# Patient Record
Sex: MALE | Race: WHITE | Employment: OTHER | URBAN - METROPOLITAN AREA
[De-identification: names, ages, dates, MRNs, and addresses within clinical notes are randomized per-mention and may not be internally consistent; named-entity substitution may affect disease eponyms.]

---

## 2017-01-01 ENCOUNTER — HOSPITAL ENCOUNTER (EMERGENCY)
Facility: CLINIC | Age: 67
Discharge: HOME | End: 2017-02-13
Attending: EMERGENCY MEDICINE | Admitting: EMERGENCY MEDICINE
Payer: MEDICARE

## 2017-01-01 ENCOUNTER — DOCUMENTATION ONLY (OUTPATIENT)
Dept: OTHER | Facility: CLINIC | Age: 67
End: 2017-01-01

## 2017-01-01 VITALS
OXYGEN SATURATION: 90 % | HEART RATE: 81 BPM | BODY MASS INDEX: 26.18 KG/M2 | DIASTOLIC BLOOD PRESSURE: 84 MMHG | SYSTOLIC BLOOD PRESSURE: 117 MMHG | RESPIRATION RATE: 24 BRPM | WEIGHT: 198.41 LBS

## 2017-01-01 DIAGNOSIS — G30.0 EARLY ONSET ALZHEIMER'S DISEASE WITH BEHAVIORAL DISTURBANCE (H): ICD-10-CM

## 2017-01-01 DIAGNOSIS — F02.818 EARLY ONSET ALZHEIMER'S DISEASE WITH BEHAVIORAL DISTURBANCE (H): ICD-10-CM

## 2017-01-01 DIAGNOSIS — S01.81XA LACERATION OF FOREHEAD, INITIAL ENCOUNTER: ICD-10-CM

## 2017-01-01 DIAGNOSIS — Z71.89 ACP (ADVANCE CARE PLANNING): Primary | Chronic | ICD-10-CM

## 2017-01-01 DIAGNOSIS — S09.90XA CLOSED HEAD INJURY, INITIAL ENCOUNTER: ICD-10-CM

## 2017-01-01 PROCEDURE — 12011 RPR F/E/E/N/L/M 2.5 CM/<: CPT

## 2017-01-01 PROCEDURE — 99284 EMERGENCY DEPT VISIT MOD MDM: CPT | Mod: 25

## 2017-01-01 PROCEDURE — 96372 THER/PROPH/DIAG INJ SC/IM: CPT

## 2017-01-01 PROCEDURE — 25000125 ZZHC RX 250: Performed by: EMERGENCY MEDICINE

## 2017-01-01 RX ORDER — OLANZAPINE 10 MG/2ML
5 INJECTION, POWDER, FOR SOLUTION INTRAMUSCULAR ONCE
Status: COMPLETED | OUTPATIENT
Start: 2017-01-01 | End: 2017-01-01

## 2017-01-01 RX ORDER — OLANZAPINE 10 MG/2ML
5 INJECTION, POWDER, FOR SOLUTION INTRAMUSCULAR DAILY PRN
Status: DISCONTINUED | OUTPATIENT
Start: 2017-01-01 | End: 2017-01-01 | Stop reason: HOSPADM

## 2017-01-01 RX ORDER — OLANZAPINE 10 MG/2ML
5 INJECTION, POWDER, FOR SOLUTION INTRAMUSCULAR DAILY PRN
Status: DISCONTINUED | OUTPATIENT
Start: 2017-01-01 | End: 2017-01-01

## 2017-01-01 RX ADMIN — OLANZAPINE 5 MG: 10 INJECTION, POWDER, FOR SOLUTION INTRAMUSCULAR at 10:57

## 2017-01-01 RX ADMIN — OLANZAPINE 5 MG: 10 INJECTION, POWDER, FOR SOLUTION INTRAMUSCULAR at 09:59

## 2017-02-09 PROBLEM — Z71.89 ACP (ADVANCE CARE PLANNING): Chronic | Status: ACTIVE | Noted: 2017-01-01

## 2017-02-13 NOTE — ED AVS SNAPSHOT
Emergency Department    [address] [facility] [address]    Phone:  [phone]    Fax:  [phone]                                       Armando Deluca   MRN: [id number]    Department:   Emergency Department   Date of Visit:  2/13/2017           Patient Information     Date Of Birth          1950        Your diagnoses for this visit were:     Early onset Alzheimer's disease with behavioral disturbance     Closed head injury, initial encounter     Laceration of forehead, initial encounter        You were seen by Cristhian House MD.      Follow-up Information     Follow up with Hal Viramontes MD In 1 week.    Specialty:  Family Practice    Contact information:    [facility] [address]   [address]  [facility] [address] [phone]          Follow up with  Emergency Department.    Specialty:  EMERGENCY MEDICINE    Why:  As needed, If symptoms worsen    Contact information:    [address] [facility] [id number] [phone]        Discharge Instructions         Alzheimer Disease  Alzheimer disease is a brain illness that can happen usually in older adults, but it can also happen as early as age 40. It is the most common cause of dementia. It is a progressive disease. This means it gets worse over time.  What is Alzheimer disease?  Alzheimer disease causes a series of changes to nerves of the brain. Some nerves form into clumps and tangles, and lose some of their connections to other nerves.  Healthcare providers don t fully understand what causes Alzheimer disease. But they think these may be some of the causes:    Age and family history    Certain genes    Abnormal protein deposits in the brain    Environmental factors    Problems with a person s immune system    Possibly infections  Symptoms of Alzheimer disease  The disease causes changes in behavior and thinking known as dementia. The symptoms include:    Memory  loss    Confusion    Restlessness    Personality and behavior changes    Problems with judgment    Problems communicating with others    Inability to follow directions    Lack of emotion  Diagnosing Alzheimer disease  No single test is able to diagnose Alzheimer disease. Instead healthcare providers use a series of tests to rule out other health conditions. The tests may include:    A complete medical history. This may include questions about overall health and past health problems. The healthcare provider may ask how well the person can do daily tasks. The healthcare provider may ask family or close friends about any changes in behavior or personality.    Mental status test. This is a test of memory, problem solving, attention, counting, and language.     Standard medical tests. These may include blood and urine tests to find possible causes for the problem.    Brain imaging tests.  CT, MRI, or positron emission tomography (PET) may be used to rule out other causes of the problem.  Treating Alzheimer disease  Alzheimer disease has no cure. Instead healthcare providers can help ease some symptoms. This can make a person with Alzheimer more comfortable. Treatment can also make it easier for their caregivers to take care of them.  Some medicines may help slow the decline of a person s memory, thinking, and language skills. They may help with problems of behavior, such as aggression. They can lessen hallucinations and delusions. These medicines can work for some but not all people. And they may help for only a limited time. Medicines include:    Cholinesterase inhibitors    Donepezil    Galantamine    Rivastigmine    Memantine  In some cases, behavior problems can be caused by medicine side effects. Talk with the person s healthcare provider about all medicines he or she is taking.  Keeping healthy  For a person with Alzheimer, it s important to stay healthy. Good nutrition and physical and social activity are vital. A  calm and well-structured environment will help. Make sure to keep up with healthcare appointments and managing other health conditions, such as diabetes. Some patients benefit from having a nutritionist help to prevent weight loss.    Caring for someone with Alzheimer  A person with Alzheimer will need more caregiving over time. Talk with your healthcare provider about caregiving resources.     [id number] [employer]. [address] [place] [address], [place] [id number]. All rights reserved. This information is not intended as a substitute for professional medical care. Always follow your healthcare professional's instructions.      Discharge Instructions  Head Injury    You have been seen today for a head injury. You were checked for serious problems, like bleeding on the brain, but these problems cannot always be found right away.  Due to this risk, you should not be alone for 24 hours after your injury.  Follow up with your regular physician in 7 days. If you are taking a blood thinner, such as aspirin, Pradaxa  (dabigatran), Coumadin  (warfarin), or Plavix  (clopidogrel), you are at especially high risk for immediate or delayed bleeding, and need to re-check with a physician in 24 hours, or sooner if any of the symptoms below happen.     Return to the Emergency Department if:    You are confused, have amnesia, or you are not acting right.    Your headache gets worse or you start to have a really bad headache even with your recommended treatment plan.    You vomit more than once.    You have a convulsion or seizure.    You have trouble walking.    You have weakness or paralysis in an arm or a leg.    You have blood or fluid coming from your ears or nose.    You have new symptoms or anything that worries you.    Sleeping:  It is okay for you to sleep, but someone should wake you up as instructed by your doctor, and someone should check on you at your usual time to wake up.     Activity:    Do not drive for at  least 24 hours.    Do not drive if you have dizzy spells or trouble concentrating, or remembering things.    Do not return to any contact sports until cleared by your regular doctor.     Follow-up:  It is very important that you make an appointment with your clinic and go to the appointment.  If you do not follow-up with your regular doctor, it may result in missing an important development which could result in permanent injury or disability and/or lasting pain.  If there is any problem keeping your appointment, call your doctor or return to the Emergency Department.    MORE INFORMATION:    Concussion:  A concussion is a minor head injury that may cause temporary problems with the way your brain works.  Some symptoms include:  confusion, amnesia, nausea and vomiting, dizziness, fatigue, memory or concentration problems, irritability and sleep problems.    CT Scans: Your evaluation today may have included a CT scan (CAT scan) to look for things like bleeding or a skull fracture (break).  CT scans involve radiation and too many CT scans can cause serious health problems like cancer, especially in children.  Because of this, your doctor may not have ordered a CT scan today if they think you are at low risk for a serious or life threatening problem.    If you were given a prescription for medicine here today, be sure to read all of the information (including the package insert) that comes with your prescription.  This will include important information about the medicine, its side effects, and any warnings that you need to know about.  The pharmacist who fills the prescription can provide more information and answer questions you may have about the medicine.  If you have questions or concerns that the pharmacist cannot address, please call or return to the Emergency Department.   Remember that you can always come back to the Emergency Department if you are not able to see your regular doctor in the amount of time  listed above, if you get any new symptoms, or if there is anything that worries you.  Discharge Instructions  Laceration (Cut)    You were seen today for a laceration (cut).  Your doctor examined your laceration for any problems such a buried foreign body (like glass, a splinter, or gravel), or injury to blood vessels, tendons, and nerves.  Your doctor may have also rinsed and/or scrubbed your laceration to help prevent an infection.  Your laceration may have been closed with glue, staples or sutures (stitches).      It may not be possible to find all problems with your laceration on the first visit, and we can't always prevent infections.  Antibiotics are only given when the benefit is more than the risk, and don't prevent all infections. Some lacerations are too high risk to close, and are left open to heal.  All lacerations, no matter how expertly repaired, will cause scarring.    Return to the Emergency Department right away if:    You have more redness, swelling, pain, drainage (pus), a bad smell, or red streaking from your laceration.      You have a fever of 101oF or more.    You have bleeding that you can t stop at home. If your cut starts to bleed, hold pressure on the bleeding area with a clean cloth or put pressure over the bandage.  If the bleeding doesn t stop after using constant pressure for 30 minutes, you should return to the Emergency Department for further treatment.    An area past the laceration is cool, pale, or blue compared with the other side, or has a slower return of color when squeezed.    Your dressing seems too tight or starts to get uncomfortable or painful.    You have loss of normal function or use of an area, such as being unable to straighten or bend a finger normally.    You have a numb area past the laceration.    Return to the Emergency Department or see your regular doctor if:    The laceration starts to come open.     You have something coming out of the cut or a feeling that  "there is something in the laceration.    Your wound will not heal, or keeps breaking open. There can always be glass, wood, dirt or other things in any wound.  They won t always show up, even on x-rays.  If a wound doesn t heal, this may be why, and it is important to follow-up with your regular doctor.    Home Care:    Take your dressing off in 12 hours, or as instructed by your doctor, to check your laceration. Remove the dressing sooner if it seems too tight or painful, or if it is getting numb, tingly, or pale past the dressing.    Gently wash your laceration 2 times a day with clean cloth and soap.     It is okay to shower, but do not let the laceration soak in water.      If your laceration was closed with wound adhesive or strips: pat it dry and leave it open to the air.     For all other repairs: after you wash your laceration, or at least 2 times a day, apply bacitracin or other antibiotic ointment to the laceration, then cover it with a Band-Aid  or gauze.    Keep the laceration clean. Wear gloves or other protective clothing if you are around dirt.    Follow-up:    You need to follow-up with your regular doctor in 7 days.    Scars:  To help minimize scarring:    Wear sunscreen over the healed laceration when out in the sun.    Massage the area regularly.    You may use Vitamin E oil.    Wait a year.  Most scars will start to fade within a year.    Probiotics: If you have been given an antibiotic, you may want to also take a probiotic pill or eat yogurt with live cultures. Probiotics have \"good bacteria\" to help your intestines stay healthy. Studies have shown that probiotics help prevent diarrhea and other intestine problems (including C. diff infection) when you take antibiotics. You can buy these without a prescription in the pharmacy section of the store.     If you were given a prescription for medicine here today, be sure to read all of the information (including the package insert) that comes with " your prescription.  This will include important information about the medicine, its side effects, and any warnings that you need to know about.  The pharmacist who fills the prescription can provide more information and answer questions you may have about the medicine.  If you have questions or concerns that the pharmacist cannot address, please call or return to the Emergency Department.     Remember that you can always come back to the Emergency Department if you are not able to see your regular doctor in the amount of time listed above, if you get any new symptoms, or if there is anything that worries you.            24 Hour Appointment Hotline       To make an appointment at any [facility], call [phone] ([phone]). If you don't have a family doctor or clinic, we will help you find one. [facility] clinics are conveniently located to serve the needs of you and your family.          ED Discharge Orders     HOSPICE REFERRAL       **Order classes of: [facility], [facility] and [facility] will route to the Home Care and Hospice Referral Pool.  Home Care or Hospice will then contact the patient to schedule their appointment.**    If you do not hear from Home Care and Hospice, or you would like to call to schedule, please call the referring place of service that your provider has listed below.  ______________________________________________________________________    Your provider has referred you to: PREFERRED PROVIDERS:  Caro Caldwell    Extended Emergency Contact Information  Primary Emergency Contact: Brennen Delgado  Address: [address] [place] [address] [id number] [place] [address]  Home Phone: [phone]  Mobile Phone: [phone]  Relation: Daughter  Secondary Emergency Contact: Orville Delgado   [facility]  Home Phone: [phone]  Mobile Phone: [phone]  Relation: Relative    Patient Anticipated Discharge Date: 2/13/2017   RN, PT, HHA to begin 24 - 48 hours after discharge.  PLEASE  EVALUATE AND TREAT (Evaluation timeline is 24 - 48 hrs. Please call if there is need for a variance to this timeline).    REASON FOR REFERRAL: Hospice - Diagnosis: Dementia    ADDITIONAL SERVICES NEEDED:     OTHER PERTINENT INFORMATION: Patient was last seen by provider on 2/13/2017 for Fall, Dementia.    Current Outpatient Prescriptions:  oxymetazoline (AFRIN NASAL SPRAY) 0.05 % spray, Use two sprays in affected nostril as needed for bleeding x 1-2 treatments/ day. Maximum 3 days in a row. Can cause elevated blood pressure., Disp: 1 Bottle, Rfl: 0  QUEtiapine Fumarate (SEROQUEL PO), Take 25 mg by mouth At Bedtime, Disp: , Rfl:   Memantine HCl (NAMENDA PO), Take 10 mg by mouth 2 times daily , Disp: , Rfl:   Donepezil HCl (ARICEPT PO), , Disp: , Rfl:   LISINOPRIL PO, , Disp: , Rfl:   SIMVASTATIN PO, Take 20 mg by mouth At Bedtime , Disp: , Rfl:   tamsulosin (FLOMAX) 0.4 MG 24 hr capsule, Take by mouth daily AM, Disp: , Rfl:   VITAMIN D, CHOLECALCIFEROL, PO, Take 2,000 Units by mouth daily , Disp: , Rfl:   multivitamin, therapeutic with minerals (MULTI-VITAMIN) TABS, Take 1 tablet by mouth daily, Disp: , Rfl:   ASPIRIN PO, Take 325 mg by mouth daily, Disp: , Rfl:   Omega-3 Fatty Acids (OMEGA-3 FISH OIL PO), Take 1 g by mouth 2 times daily (with meals) , Disp: , Rfl:       Patient Active Problem List:     ACP (advance care planning)      Documentation of Face to Face and Certification for Home Health Services    I certify that patient, Armando Deluca is under my care and that I, or a Nurse Practitioner or Physician's Assistant working with me, had a face-to-face encounter that meets the physician face-to-face encounter requirements with this patient on: 2/13/2017.    This encounter with the patient was in whole, or in part, for the following medical condition, which is the primary reason for Home Health Care: Dementia.    I certify that, based on my findings, the following services are medically necessary Home  Health Services:     My clinical findings support the need for the above services because:     Further, I certify that my clinical findings support that this patient is homebound (i.e. absences from home require considerable and taxing effort and are for medical reasons or Sh[employer] services or infrequently or of short duration when for other reasons) because: Patient is bedbound due to: Dementia/Falls.    Based on the above findings, I certify that this patient is confined to the home and needs intermittent skilled nursing care, physical therapy and/or speech therapy.  The patient is under my care, and I have initiated the establishment of the plan of care.  This patient will be followed by a physician who will periodically review the plan of care.    Physician/Provider to provide follow up care: Hal Viramontes certified Physician at time of discharge: Cristhian House MD    Please be aware that coverage of these services is subject to the terms and limitations of your health insurance plan.  Call member services at your health plan with any benefit or coverage questions.                     Review of your medicines      Our records show that you are taking the medicines listed below. If these are incorrect, please call your family doctor or clinic.        Dose / Directions Last dose taken    ARICEPT PO   Indication:  Alzheimer's Disease        Refills:  0        ASPIRIN PO   Dose:  325 mg        Take 325 mg by mouth daily   Refills:  0        FLOMAX 0.4 MG capsule   Indication:  Enlarged Prostate with Urination Problems   Generic drug:  tamsulosin        Take by mouth daily AM   Refills:  0        LISINOPRIL PO   Indication:  High Blood Pressure        Refills:  0        Multi-vitamin Tabs tablet   Dose:  1 tablet        Take 1 tablet by mouth daily   Refills:  0        NAMENDA PO   Dose:  10 mg   Indication:  Moderate to Severe Alzheimer's Disease        Take 10 mg by mouth 2 times daily    Refills:  0        OMEGA-3 FISH OIL PO   Dose:  1 g        Take 1 g by mouth 2 times daily (with meals)   Refills:  0        oxymetazoline 0.05 % spray   Commonly known as:  AFRIN NASAL SPRAY   Quantity:  1 Bottle        Use two sprays in affected nostril as needed for bleeding x 1-2 treatments/ day. Maximum 3 days in a row. Can cause elevated blood pressure.   Refills:  0        SEROQUEL PO   Dose:  25 mg        Take 25 mg by mouth At Bedtime   Refills:  0        SIMVASTATIN PO   Dose:  20 mg        Take 20 mg by mouth At Bedtime   Refills:  0        VITAMIN D (CHOLECALCIFEROL) PO   Dose:  2000 Units        Take 2,000 Units by mouth daily   Refills:  0                Procedures and tests performed during your visit     I have reviewed and agree with all the recommendations and orders detailed in this document.      Orders Needing Specimen Collection     None      Pending Results     No orders found from 2/11/2017 to 2/14/2017.            Pending Culture Results     No orders found from 2/11/2017 to 2/14/2017.             Test Results from your hospital stay            Clinical Quality Measure: Blood Pressure Screening     Your blood pressure was checked while you were in the emergency department today. The last reading we obtained was  BP: 117/84 . Please read the guidelines below about what these numbers mean and what you should do about them.  If your systolic blood pressure (the top number) is less than 120 and your diastolic blood pressure (the bottom number) is less than 80, then your blood pressure is normal. There is nothing more that you need to do about it.  If your systolic blood pressure (the top number) is 120-139 or your diastolic blood pressure (the bottom number) is 80-89, your blood pressure may be higher than it should be. You should have your blood pressure rechecked within a year by a primary care provider.  If your systolic blood pressure (the top number) is 140 or greater or your diastolic  "blood pressure (the bottom number) is 90 or greater, you may have high blood pressure. High blood pressure is treatable, but if left untreated over time it can put you at risk for heart attack, stroke, or kidney failure. You should have your blood pressure rechecked by a primary care provider within the next 4 weeks.  If your provider in the emergency department today gave you specific instructions to follow-up with your doctor or provider even sooner than that, you should follow that instruction and not wait for up to 4 weeks for your follow-up visit.        Thank you for choosing [facility]       Thank you for choosing [facility] for your care. Our goal is always to provide you with excellent care. Hearing back from our patients is one way we can continue to improve our services. Please take a few minutes to complete the written survey that you may receive in the mail after you visit with us. Thank you!        Statement of Approval     Ordered          17 1216  I have reviewed and agree with all the recommendations and orders detailed in this document.  EFFECTIVE NOW     Approved and electronically signed by:  Cristhian House MD             [employer] Information     [facility]ler lets you send messages to your doctor, view your test results, renew your prescriptions, schedule appointments and more. To sign up, go to [email or web address] . Click on \"Log in\" on the left side of the screen, which will take you to the Welcome page. Then click on \"Sign up Now\" on the right side of the page.     You will be asked to enter the access code listed below, as well as some personal information. Please follow the directions to create your username and password.     Your access code is: [email or web address]  Expires: 2017 10:29 AM     Your access code will  in 90 days. If you need help or a new code, please call your [facility] clinic or [phone].        Care EveryWhere ID     This is your Care EveryWhere ID. This " could be used by other organizations to access your [facility] medical records  [id number]        After Visit Summary       This is your record. Keep this with you and show to your community pharmacist(s) and doctor(s) at your next visit.
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
## 2017-02-13 NOTE — DISCHARGE INSTRUCTIONS
Alzheimer Disease  Alzheimer disease is a brain illness that can happen usually in older adults, but it can also happen as early as age 40. It is the most common cause of dementia. It is a progressive disease. This means it gets worse over time.  What is Alzheimer disease?  Alzheimer disease causes a series of changes to nerves of the brain. Some nerves form into clumps and tangles, and lose some of their connections to other nerves.  Healthcare providers don t fully understand what causes Alzheimer disease. But they think these may be some of the causes:    Age and family history    Certain genes    Abnormal protein deposits in the brain    Environmental factors    Problems with a person s immune system    Possibly infections  Symptoms of Alzheimer disease  The disease causes changes in behavior and thinking known as dementia. The symptoms include:    Memory loss    Confusion    Restlessness    Personality and behavior changes    Problems with judgment    Problems communicating with others    Inability to follow directions    Lack of emotion  Diagnosing Alzheimer disease  No single test is able to diagnose Alzheimer disease. Instead healthcare providers use a series of tests to rule out other health conditions. The tests may include:    A complete medical history. This may include questions about overall health and past health problems. The healthcare provider may ask how well the person can do daily tasks. The healthcare provider may ask family or close friends about any changes in behavior or personality.    Mental status test. This is a test of memory, problem solving, attention, counting, and language.     Standard medical tests. These may include blood and urine tests to find possible causes for the problem.    Brain imaging tests.  CT, MRI, or positron emission tomography (PET) may be used to rule out other causes of the problem.  Treating Alzheimer disease  Alzheimer disease has no cure. Instead healthcare  providers can help ease some symptoms. This can make a person with Alzheimer more comfortable. Treatment can also make it easier for their caregivers to take care of them.  Some medicines may help slow the decline of a person s memory, thinking, and language skills. They may help with problems of behavior, such as aggression. They can lessen hallucinations and delusions. These medicines can work for some but not all people. And they may help for only a limited time. Medicines include:    Cholinesterase inhibitors    Donepezil    Galantamine    Rivastigmine    Memantine  In some cases, behavior problems can be caused by medicine side effects. Talk with the person s healthcare provider about all medicines he or she is taking.  Keeping healthy  For a person with Alzheimer, it s important to stay healthy. Good nutrition and physical and social activity are vital. A calm and well-structured environment will help. Make sure to keep up with healthcare appointments and managing other health conditions, such as diabetes. Some patients benefit from having a nutritionist help to prevent weight loss.    Caring for someone with Alzheimer  A person with Alzheimer will need more caregiving over time. Talk with your healthcare provider about caregiving resources.     9084-5213 [employer]. [address] [place] [address]. All rights reserved. This information is not intended as a substitute for professional medical care. Always follow your healthcare professional's instructions.      Discharge Instructions  Head Injury    You have been seen today for a head injury. You were checked for serious problems, like bleeding on the brain, but these problems cannot always be found right away.  Due to this risk, you should not be alone for 24 hours after your injury.  Follow up with your regular physician in 7 days. If you are taking a blood thinner, such as aspirin, Pradaxa  (dabigatran), Coumadin  (warfarin), or  Plavix  (clopidogrel), you are at especially high risk for immediate or delayed bleeding, and need to re-check with a physician in 24 hours, or sooner if any of the symptoms below happen.     Return to the Emergency Department if:    You are confused, have amnesia, or you are not acting right.    Your headache gets worse or you start to have a really bad headache even with your recommended treatment plan.    You vomit more than once.    You have a convulsion or seizure.    You have trouble walking.    You have weakness or paralysis in an arm or a leg.    You have blood or fluid coming from your ears or nose.    You have new symptoms or anything that worries you.    Sleeping:  It is okay for you to sleep, but someone should wake you up as instructed by your doctor, and someone should check on you at your usual time to wake up.     Activity:    Do not drive for at least 24 hours.    Do not drive if you have dizzy spells or trouble concentrating, or remembering things.    Do not return to any contact sports until cleared by your regular doctor.     Follow-up:  It is very important that you make an appointment with your clinic and go to the appointment.  If you do not follow-up with your regular doctor, it may result in missing an important development which could result in permanent injury or disability and/or lasting pain.  If there is any problem keeping your appointment, call your doctor or return to the Emergency Department.    MORE INFORMATION:    Concussion:  A concussion is a minor head injury that may cause temporary problems with the way your brain works.  Some symptoms include:  confusion, amnesia, nausea and vomiting, dizziness, fatigue, memory or concentration problems, irritability and sleep problems.    CT Scans: Your evaluation today may have included a CT scan (CAT scan) to look for things like bleeding or a skull fracture (break).  CT scans involve radiation and too many CT scans can cause serious  health problems like cancer, especially in children.  Because of this, your doctor may not have ordered a CT scan today if they think you are at low risk for a serious or life threatening problem.    If you were given a prescription for medicine here today, be sure to read all of the information (including the package insert) that comes with your prescription.  This will include important information about the medicine, its side effects, and any warnings that you need to know about.  The pharmacist who fills the prescription can provide more information and answer questions you may have about the medicine.  If you have questions or concerns that the pharmacist cannot address, please call or return to the Emergency Department.   Remember that you can always come back to the Emergency Department if you are not able to see your regular doctor in the amount of time listed above, if you get any new symptoms, or if there is anything that worries you.  Discharge Instructions  Laceration (Cut)    You were seen today for a laceration (cut).  Your doctor examined your laceration for any problems such a buried foreign body (like glass, a splinter, or gravel), or injury to blood vessels, tendons, and nerves.  Your doctor may have also rinsed and/or scrubbed your laceration to help prevent an infection.  Your laceration may have been closed with glue, staples or sutures (stitches).      It may not be possible to find all problems with your laceration on the first visit, and we can't always prevent infections.  Antibiotics are only given when the benefit is more than the risk, and don't prevent all infections. Some lacerations are too high risk to close, and are left open to heal.  All lacerations, no matter how expertly repaired, will cause scarring.    Return to the Emergency Department right away if:    You have more redness, swelling, pain, drainage (pus), a bad smell, or red streaking from your laceration.      You have a  fever of 101oF or more.    You have bleeding that you can t stop at home. If your cut starts to bleed, hold pressure on the bleeding area with a clean cloth or put pressure over the bandage.  If the bleeding doesn t stop after using constant pressure for 30 minutes, you should return to the Emergency Department for further treatment.    An area past the laceration is cool, pale, or blue compared with the other side, or has a slower return of color when squeezed.    Your dressing seems too tight or starts to get uncomfortable or painful.    You have loss of normal function or use of an area, such as being unable to straighten or bend a finger normally.    You have a numb area past the laceration.    Return to the Emergency Department or see your regular doctor if:    The laceration starts to come open.     You have something coming out of the cut or a feeling that there is something in the laceration.    Your wound will not heal, or keeps breaking open. There can always be glass, wood, dirt or other things in any wound.  They won t always show up, even on x-rays.  If a wound doesn t heal, this may be why, and it is important to follow-up with your regular doctor.    Home Care:    Take your dressing off in 12 hours, or as instructed by your doctor, to check your laceration. Remove the dressing sooner if it seems too tight or painful, or if it is getting numb, tingly, or pale past the dressing.    Gently wash your laceration 2 times a day with clean cloth and soap.     It is okay to shower, but do not let the laceration soak in water.      If your laceration was closed with wound adhesive or strips: pat it dry and leave it open to the air.     For all other repairs: after you wash your laceration, or at least 2 times a day, apply bacitracin or other antibiotic ointment to the laceration, then cover it with a Band-Aid  or gauze.    Keep the laceration clean. Wear gloves or other protective clothing if you are around  "dirt.    Follow-up:    You need to follow-up with your regular doctor in 7 days.    Scars:  To help minimize scarring:    Wear sunscreen over the healed laceration when out in the sun.    Massage the area regularly.    You may use Vitamin E oil.    Wait a year.  Most scars will start to fade within a year.    Probiotics: If you have been given an antibiotic, you may want to also take a probiotic pill or eat yogurt with live cultures. Probiotics have \"good bacteria\" to help your intestines stay healthy. Studies have shown that probiotics help prevent diarrhea and other intestine problems (including C. diff infection) when you take antibiotics. You can buy these without a prescription in the pharmacy section of the store.     If you were given a prescription for medicine here today, be sure to read all of the information (including the package insert) that comes with your prescription.  This will include important information about the medicine, its side effects, and any warnings that you need to know about.  The pharmacist who fills the prescription can provide more information and answer questions you may have about the medicine.  If you have questions or concerns that the pharmacist cannot address, please call or return to the Emergency Department.     Remember that you can always come back to the Emergency Department if you are not able to see your regular doctor in the amount of time listed above, if you get any new symptoms, or if there is anything that worries you.          "

## 2017-02-13 NOTE — PROGRESS NOTES
I called Marilee at [facility] and let her know this pt's ride will be here around 1:20 and he will be back to them around 2pm.  Marilee agreed.  I gave her the discharge plan of hospice and let her know about the  Hospice meeting today at 3:30 with this pt's daughter Brennen.  Marilee had no further questions at this time.
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
## 2017-02-13 NOTE — PROGRESS NOTES
I spoke with this pt's daughter Brennen and the admissions person at [facility] of [facility].  With many phone calls the discharge plan will be to have this pt return to his [facility] on hospice.  I spoke with Brennen and she agrees to hospice and would like to use [facility]. I arranged for [facility] to meet Brennen at this pt's memory care facility at 3:30 today. I will fax this pt's face sheet, hospice consult order and the ED provider note to Alberta at [facility].  This pt can return to [facility] when discharged. Staff updated.

## 2017-02-13 NOTE — ED AVS SNAPSHOT
Emergency Department    [address] [place] [id number]    Phone:  [phone]    Fax:  [phone]                                       Armando Deluca   MRN: [id number]    Department:   Emergency Department   Date of Visit:  2/13/2017           After Visit Summary Signature Page     I have received my discharge instructions, and my questions have been answered. I have discussed any challenges I see with this plan with the nurse or doctor.    ..........................................................................................................................................  Patient/Patient Representative Signature      ..........................................................................................................................................  Patient Representative Print Name and Relationship to Patient    ..................................................               ................................................  Date                                            Time    ..........................................................................................................................................  Reviewed by Signature/Title    ...................................................              ..............................................  Date                                                            Time